# Patient Record
(demographics unavailable — no encounter records)

---

## 2021-12-31 NOTE — ED ABDOMINAL PAIN
General


Stated Complaint:  N/V/D; ABD/BACK TIGHTNESS DURING PREG


Source of Information:  Patient


Exam Limitations:  No Limitations





History of Present Illness


Date Seen by Provider:  Dec 31, 2021


Time Seen by Provider:  09:57


Initial Comments


24yoF  at 19 weeks 5 days gestation coming in for 2 days n/v/d that is nb/nb

and low back/low abdominal cramping. Prior pregnancy had  labor around 24

weeks and the tightness in her abdomen and back feel similar. Denies vaginal 

bleeding, loss of fluids, contractions. The pain lasts about a minutes at a time

about every 10 minutes. Took some tylenol this morning which helped some. 


Her son recently had a GI bug. She has been fully vaccinated for COVID.





Allergies and Home Medications


Allergies


Coded Allergies:  


     No Known Drug Allergies (Unverified , 21)





Patient Home Medication List


Home Medication List Reviewed:  Yes





Review of Systems


Review of Systems


Constitutional:  No chills, No fever


EENTM:  No Blurred Vision


Respiratory:  Denies Cough


Cardiovascular:  Denies Chest Pain


Gastrointestinal:  Abdominal Pain; Denies Diarrhea, Denies Nausea, Denies 

Vomiting


Genitourinary:  Denies Burning


Musculoskeletal:  no symptoms reported


Skin:  no symptoms reported


Psychiatric/Neurological:  No Symptoms Reported


Endocrine:  No Symptoms Reported


Hematologic/Lymphatic:  No Symptoms Reported





All Other Systems Reviewed


Negative Unless Noted:  Yes





Past Medical-Social-Family Hx


Patient Social History


Tobacco Use?:  No





Past Medical History


Surgeries:  Yes


Gallbladder





Physical Exam


Vital Signs





Vital Signs - First Documented








 21





 09:53


 


Temp 36.0


 


Pulse 109


 


Resp 16


 


B/P (MAP) 146/96 (113)


 


Pulse Ox 97


 


O2 Delivery Room Air





Capillary Refill :


Height/Weight/BMI


Height: '"


Weight: lbs. oz. kg;  BMI


Method:


General Appearance:  WD/WN, no apparent distress


HEENT:  PERRL/EOMI, normal ENT inspection, pharynx normal


Neck:  non-tender, full range of motion, supple, normal inspection


Respiratory:  chest non-tender, lungs clear, normal breath sounds, no 

respiratory distress, no accessory muscle use


Cardiovascular:  regular rate, rhythm, no edema, no murmur


Gastrointestinal:  normal bowel sounds, non tender, soft; No distended, No 

guarding, No rebound, No tenderness


Extremities:  normal range of motion, non-tender, normal inspection, no pedal 

edema, no calf tenderness, normal capillary refill


Back:  normal inspection, no CVA tenderness, no vertebral tenderness


Neurologic/Psychiatric:  no motor/sensory deficits, alert, normal mood/affect


Skin:  normal color, warm/dry


Lymphatic:  no adenopathy





Progress/Results/Core Measures


Results/Orders


Lab Results





Laboratory Tests








Test


 21


10:33 21


10:38 21


10:50 Range/Units


 


 


Influenza Type A Antigen NEGATIVE    NEGATIVE  


 


Influenza Type B Antigen NEGATIVE    NEGATIVE  


 


Urine Color  ORANGE    


 


Urine Clarity  CLOUDY    


 


Urine pH  6.0   5-9  


 


Urine Specific Gravity  1.025 H  1.016-1.022  


 


Urine Protein  TRACE H  NEGATIVE  


 


Urine Glucose (UA)  NEGATIVE   NEGATIVE  


 


Urine Ketones  TRACE H  NEGATIVE  


 


Urine Nitrite  NEGATIVE   NEGATIVE  


 


Urine Bilirubin  1+ H  NEGATIVE  


 


Urine Urobilinogen  0.2   < = 1.0  MG/DL


 


Urine Leukocyte Esterase  1+ H  NEGATIVE  


 


Urine RBC (Auto)  NEGATIVE   NEGATIVE  


 


Urine RBC  0-2    /HPF


 


Urine WBC  5-10 H   /HPF


 


Urine Squamous Epithelial


Cells 


 2-5 


 


  /HPF





 


Urine Crystals  NONE    /LPF


 


Urine Bacteria  LARGE H   /HPF


 


Urine Casts  NONE    /LPF


 


Urine Mucus  SMALL H   /LPF


 


Urine Culture Indicated  YES    


 


White Blood Count


 


 


 9.4 


 4.3-11.0


10^3/uL


 


Red Blood Count


 


 


 4.46 


 3.80-5.11


10^6/uL


 


Hemoglobin   13.4  11.5-16.0  g/dL


 


Hematocrit   39  35-52  %


 


Mean Corpuscular Volume   86  80-99  fL


 


Mean Corpuscular Hemoglobin   30  25-34  pg


 


Mean Corpuscular Hemoglobin


Concent 


 


 35 


 32-36  g/dL





 


Red Cell Distribution Width   13.0  10.0-14.5  %


 


Platelet Count


 


 


 199 


 130-400


10^3/uL


 


Mean Platelet Volume   11.2  9.0-12.2  fL


 


Immature Granulocyte % (Auto)   0   %


 


Neutrophils (%) (Auto)   76 H 42-75  %


 


Lymphocytes (%) (Auto)   18  12-44  %


 


Monocytes (%) (Auto)   5  0-12  %


 


Eosinophils (%) (Auto)   1  0-10  %


 


Basophils (%) (Auto)   0  0-10  %


 


Neutrophils # (Auto)   7.1  1.8-7.8  X 10^3


 


Lymphocytes # (Auto)   1.7  1.0-4.0  X 10^3


 


Monocytes # (Auto)   0.5  0.0-1.0  X 10^3


 


Eosinophils # (Auto)


 


 


 0.1 


 0.0-0.3


10^3/uL


 


Basophils # (Auto)


 


 


 0.0 


 0.0-0.1


10^3/uL


 


Immature Granulocyte # (Auto)


 


 


 0.0 


 0.0-0.1


10^3/uL


 


Sodium Level   134 L 135-145  MMOL/L


 


Potassium Level   3.6  3.6-5.0  MMOL/L


 


Chloride Level   103    MMOL/L


 


Carbon Dioxide Level   19 L 21-32  MMOL/L


 


Anion Gap   12  5-14  MMOL/L


 


Blood Urea Nitrogen   8  7-18  MG/DL


 


Creatinine


 


 


 0.65 


 0.60-1.30


MG/DL


 


Estimat Glomerular Filtration


Rate 


 


 112 


  





 


BUN/Creatinine Ratio   12   


 


Glucose Level   83    MG/DL


 


Calcium Level   9.2  8.5-10.1  MG/DL


 


Corrected Calcium   9.3  8.5-10.1  MG/DL


 


Total Bilirubin   0.6  0.1-1.0  MG/DL


 


Aspartate Amino Transf


(AST/SGOT) 


 


 18 


 5-34  U/L





 


Alanine Aminotransferase


(ALT/SGPT) 


 


 24 


 0-55  U/L





 


Alkaline Phosphatase   111    U/L


 


Total Protein   7.4  6.4-8.2  GM/DL


 


Albumin   3.9  3.2-4.5  GM/DL


 


Lipase   21  8-78  U/L








My Orders





Orders - MOOKIE RAPP MD


Cbc With Automated Diff (21 10:24)


Comprehensive Metabolic Panel (21 10:24)


Lipase (21 10:24)


Ua Culture If Indicated (21 10:24)


Influenza A & B Antigens (21 10:24)


Ed Iv/Invasive Line Start (21 10:24)


Lactated Ringers (Lr 1000 Ml Iv Solution (21 10:30)


Acetaminophen  Tablet (Tylenol  Tablet) (21 10:30)


Covid 19 Inhouse Test (21 10:31)


Ondansetron Injection (Zofran Injectio (21 11:00)


Urine Culture (21 10:38)


D5 Lr Iv Solution (Dextrose 5%/Lactated (21 11:03)


Cefdinir Capsule (Omnicef Capsule) (21 11:15)





Medications Given in ED





Current Medications








 Medications  Dose


 Ordered  Sig/Aleisha


 Route  Start Time


 Stop Time Status Last Admin


Dose Admin


 


 Acetaminophen  1,000 mg  ONCE  ONCE


 PO  21 10:30


 21 10:31 DC 21 10:50


1,000 MG


 


 Cefdinir  300 mg  ONCE  ONCE


 PO  21 11:15


 21 11:16 DC 21 11:14


300 MG


 


 Lactated Ringer's  1,000 ml @ 


 0 mls/hr  Q0M ONCE


 IV  21 10:30


 21 10:31 DC 21 10:50


0 MLS/HR


 


 Ondansetron HCl  4 mg  ONCE  ONCE


 IVP  21 11:00


 21 11:01 DC 21 10:57


4 MG








Vital Signs/I&O











 21





 09:53


 


Temp 36.0


 


Pulse 109


 


Resp 16


 


B/P (MAP) 146/96 (113)


 


Pulse Ox 97


 


O2 Delivery Room Air











Progress


Progress Note :  


Progress Note


24-year-old female with above history coming in due to vomiting, diarrhea, lower

abdominal and lower back cramping.  ABCs were intact and vitals are stable on 

presentation.  Physical exam with a soft, nontender abdomen.  Uterus just at the

level of the umbilicus.  She is mildly tachycardic but given the volume 

depletion, IV placed and she was given a bolus of IV fluids.  Given Tylenol as 

well for the cramping.  Fetal heart tones positive at a rate of 155.  Basic labs

obtained including liver function test, urinalysis, COVID test, flu test. 


Basic labs are reassuring including normal LFTs and white blood cell count.  

Urinalysis does have ketones, bacteria, leukocyte esterase.  Given her lower 

abdominal discomfort with this urinalysis and her being pregnant, will treat her

as cystitis.  She is having some lower back cramping, so we will give cefdinir 

just in case we need kidney penetration as well.  On reassessment after 2 L of 

IV fluids, her symptoms have nearly completely resolved.  She was feeling much 

better.  I believe she is stable for discharge with outpatient follow-up.  She 

was sent home with strict return precautions peer





Departure


Impression





   Primary Impression:  


   Pregnant


   Qualified Codes:  Z3A.20 - 20 weeks gestation of pregnancy


   Additional Impressions:  


   Cystitis


   Vomiting and diarrhea


Disposition:  01 HOME, SELF-CARE


Condition:  Stable





Departure-Patient Inst.


Decision time for Depature:  12:05


Referrals:  


ANGELA PIERRE MD (PCP)


Primary Care Physician


Patient Instructions:  Urinary Tract Infection, Adult (DC), Nausea and Vomiting,

Adult ED





Add. Discharge Instructions:  


It does appear like you have a urinary tract infection, which during pregnancy 

can cause more symptoms than usually would have.  You will take an antibiotic 

for 5 days.  Please try to drink plenty of fluids and you can take Tylenol for 

pain.  Please follow-up with your OB on Monday if you are not feeling 100%.  

Your COVID test will come back in the next couple hours and we will call you.


Scripts


Ondansetron (Ondansetron Odt) 4 Mg Tab.rapdis


4 MG PO Q6H PRN for NAUSEA/VOMITING-1ST LINE for 5 Days, #20 TAB


   Prov: MOOKIE RAPP MD         21 


Cefdinir (Cefdinir) 300 Mg Capsule


300 MG PO BID for 5 Days, #10 CAP 0 Refills


   Prov: MOOKIE RAPP MD         21


Work/School Note:  Work Release Form   Date Seen in the Emergency Department:  

Dec 31, 2021


   Return to Work:  2022


   Restrictions:  Return-No Vomiting(24hrs)











MOOKIE RAPP MD          Dec 31, 2021 10:02